# Patient Record
Sex: FEMALE | Race: WHITE
[De-identification: names, ages, dates, MRNs, and addresses within clinical notes are randomized per-mention and may not be internally consistent; named-entity substitution may affect disease eponyms.]

---

## 2022-10-28 ENCOUNTER — HOSPITAL ENCOUNTER (EMERGENCY)
Dept: HOSPITAL 4 - SED | Age: 37
LOS: 1 days | Discharge: HOME | End: 2022-10-29
Payer: MEDICAID

## 2022-10-28 VITALS — HEIGHT: 61 IN | BODY MASS INDEX: 21.71 KG/M2 | WEIGHT: 115 LBS

## 2022-10-28 DIAGNOSIS — J20.8: Primary | ICD-10-CM

## 2022-10-28 DIAGNOSIS — Z79.899: ICD-10-CM

## 2022-10-28 DIAGNOSIS — R06.02: ICD-10-CM

## 2022-10-28 DIAGNOSIS — R05.9: ICD-10-CM

## 2022-10-29 VITALS — SYSTOLIC BLOOD PRESSURE: 157 MMHG

## 2022-10-29 VITALS — SYSTOLIC BLOOD PRESSURE: 124 MMHG

## 2022-10-29 NOTE — NUR
Patient given written and verbal discharge instructions and verbalizes 
understanding.  ER MD discussed with patient the results and treatment 
provided. Patient in stable condition. ID arm band removed.

Rx of XOPENEX  given. Patient educated on pain management and to follow up with 
PMD. Pain Scale 0/10

Opportunity for questions provided and answered. Medication side effect fact 
sheet provided.

## 2022-10-29 NOTE — NUR
PER PATIENT, PATIENT HAS BEEN SICK WITH CONGESTION AND HEAD COLD FOR AWHILE AND 
NOW  STATES SHE HAS NEEDED ALBUTEROL AND GETS SEVERE ALLERGIES, SO SHE FEELS 
SOB. NO CHEST PAIN AT THIS TIME.